# Patient Record
Sex: MALE | Race: WHITE
[De-identification: names, ages, dates, MRNs, and addresses within clinical notes are randomized per-mention and may not be internally consistent; named-entity substitution may affect disease eponyms.]

---

## 2022-11-29 ENCOUNTER — HOSPITAL ENCOUNTER (INPATIENT)
Dept: HOSPITAL 12 - ER | Age: 77
LOS: 2 days | Discharge: HOME | DRG: 871 | End: 2022-12-01
Attending: INTERNAL MEDICINE
Payer: COMMERCIAL

## 2022-11-29 VITALS — BODY MASS INDEX: 27.09 KG/M2 | WEIGHT: 200 LBS | HEIGHT: 72 IN

## 2022-11-29 DIAGNOSIS — I70.90: ICD-10-CM

## 2022-11-29 DIAGNOSIS — U07.1: ICD-10-CM

## 2022-11-29 DIAGNOSIS — I10: ICD-10-CM

## 2022-11-29 DIAGNOSIS — E87.6: ICD-10-CM

## 2022-11-29 DIAGNOSIS — D68.59: ICD-10-CM

## 2022-11-29 DIAGNOSIS — D64.9: ICD-10-CM

## 2022-11-29 DIAGNOSIS — D50.9: ICD-10-CM

## 2022-11-29 DIAGNOSIS — D69.6: ICD-10-CM

## 2022-11-29 DIAGNOSIS — N39.0: ICD-10-CM

## 2022-11-29 DIAGNOSIS — N17.0: ICD-10-CM

## 2022-11-29 DIAGNOSIS — Z85.01: ICD-10-CM

## 2022-11-29 DIAGNOSIS — E86.0: ICD-10-CM

## 2022-11-29 DIAGNOSIS — G92.8: ICD-10-CM

## 2022-11-29 DIAGNOSIS — E87.1: ICD-10-CM

## 2022-11-29 DIAGNOSIS — E66.9: ICD-10-CM

## 2022-11-29 DIAGNOSIS — B96.89: ICD-10-CM

## 2022-11-29 DIAGNOSIS — Z98.890: ICD-10-CM

## 2022-11-29 DIAGNOSIS — E78.5: ICD-10-CM

## 2022-11-29 DIAGNOSIS — Z74.09: ICD-10-CM

## 2022-11-29 DIAGNOSIS — A41.89: Primary | ICD-10-CM

## 2022-11-29 DIAGNOSIS — J21.8: ICD-10-CM

## 2022-11-29 DIAGNOSIS — R53.1: ICD-10-CM

## 2022-11-29 LAB
ALP SERPL-CCNC: 72 U/L (ref 50–136)
ALT SERPL W/O P-5'-P-CCNC: 23 U/L (ref 16–63)
AST SERPL-CCNC: 30 U/L (ref 15–37)
BILIRUB DIRECT SERPL-MCNC: 0.4 MG/DL (ref 0–0.2)
BILIRUB SERPL-MCNC: 2 MG/DL (ref 0.2–1)
BUN SERPL-MCNC: 15 MG/DL (ref 7–18)
CHLORIDE SERPL-SCNC: 102 MMOL/L (ref 98–107)
CO2 SERPL-SCNC: 26 MMOL/L (ref 21–32)
CREAT SERPL-MCNC: 1.2 MG/DL (ref 0.6–1.3)
GLUCOSE SERPL-MCNC: 110 MG/DL (ref 74–106)
HCT VFR BLD AUTO: 37.8 % (ref 36.7–47.1)
LYMPHOCYTES NFR BLD MANUAL: 25 % (ref 20–40)
MCH RBC QN AUTO: 30.6 UUG (ref 23.8–33.4)
MCV RBC AUTO: 92.6 FL (ref 73–96.2)
MONOCYTES NFR BLD MANUAL: 15 % (ref 2–10)
NEUTS BAND NFR BLD MANUAL: 2 % (ref 0–10)
NEUTS SEG NFR BLD MANUAL: 58 % (ref 42–75)
PLATELET # BLD AUTO: 124 K/UL (ref 152–348)
POTASSIUM SERPL-SCNC: 3.6 MMOL/L (ref 3.5–5.1)
WS STN SPEC: 7.8 G/DL (ref 6.4–8.2)

## 2022-11-29 PROCEDURE — G0378 HOSPITAL OBSERVATION PER HR: HCPCS

## 2022-11-29 PROCEDURE — A4663 DIALYSIS BLOOD PRESSURE CUFF: HCPCS

## 2022-11-29 PROCEDURE — C9113 INJ PANTOPRAZOLE SODIUM, VIA: HCPCS

## 2022-11-29 NOTE — NUR
Called [facility] to ask for updates regarding transfer, spoke with Miguel and 
was told that no beds available at this time. Will call me back for updates

## 2022-11-30 LAB
ALP SERPL-CCNC: 60 U/L (ref 50–136)
ALT SERPL W/O P-5'-P-CCNC: 18 U/L (ref 16–63)
APPEARANCE UR: CLEAR
AST SERPL-CCNC: 14 U/L (ref 15–37)
BILIRUB DIRECT SERPL-MCNC: 0.3 MG/DL (ref 0–0.2)
BILIRUB SERPL-MCNC: 1 MG/DL (ref 0.2–1)
BILIRUB UR QL STRIP: NEGATIVE
BUN SERPL-MCNC: 23 MG/DL (ref 7–18)
CHLORIDE SERPL-SCNC: 104 MMOL/L (ref 98–107)
CHOLEST SERPL-MCNC: 112 MG/DL (ref ?–200)
CO2 SERPL-SCNC: 27 MMOL/L (ref 21–32)
COLOR UR: YELLOW
CREAT SERPL-MCNC: 1.3 MG/DL (ref 0.6–1.3)
DEPRECATED SQUAMOUS URNS QL MICRO: (no result) /HPF
FERRITIN SERPL-MCNC: 148 NG/ML (ref 26–388)
GLUCOSE SERPL-MCNC: 97 MG/DL (ref 74–106)
GLUCOSE UR STRIP-MCNC: NEGATIVE MG/DL
HCT VFR BLD AUTO: 34 % (ref 36.7–47.1)
HDLC SERPL-MCNC: 43 MG/DL (ref 40–60)
HGB UR QL STRIP: (no result)
KETONES UR STRIP-MCNC: NEGATIVE MG/DL
LEUKOCYTE ESTERASE UR QL STRIP: (no result)
LYMPHOCYTES NFR BLD MANUAL: 35 % (ref 20–40)
MAGNESIUM SERPL-MCNC: 1.8 MG/DL (ref 1.8–2.4)
MCH RBC QN AUTO: 30.9 UUG (ref 23.8–33.4)
MCV RBC AUTO: 92.7 FL (ref 73–96.2)
MONOCYTES NFR BLD MANUAL: 16 % (ref 2–10)
NEUTS SEG NFR BLD MANUAL: 49 % (ref 42–75)
NITRITE UR QL STRIP: NEGATIVE
PH UR STRIP: 5.5 [PH] (ref 5–8)
PHOSPHATE SERPL-MCNC: 3.5 MG/DL (ref 2.5–4.9)
PLATELET # BLD AUTO: 99 K/UL (ref 152–348)
POTASSIUM SERPL-SCNC: 4 MMOL/L (ref 3.5–5.1)
RBC #/AREA URNS HPF: (no result) /HPF (ref 0–3)
SP GR UR STRIP: 1.02 (ref 1–1.03)
TRIGL SERPL-MCNC: 63 MG/DL (ref 30–150)
TSH SERPL DL<=0.005 MIU/L-ACNC: 0.41 MIU/ML (ref 0.36–3.74)
UROBILINOGEN UR STRIP-MCNC: 0.2 E.U./DL
WBC #/AREA URNS HPF: (no result) /HPF
WBC #/AREA URNS HPF: (no result) /HPF (ref 0–3)
WS STN SPEC: 6.8 G/DL (ref 6.4–8.2)

## 2022-11-30 RX ADMIN — LOSARTAN POTASSIUM SCH MG: 50 TABLET, FILM COATED ORAL at 09:00

## 2022-11-30 RX ADMIN — AMLODIPINE BESYLATE SCH MG: 5 TABLET ORAL at 09:00

## 2022-11-30 RX ADMIN — DEXTROSE SCH MLS/HR: 50 INJECTION, SOLUTION INTRAVENOUS at 06:10

## 2022-11-30 RX ADMIN — LOSARTAN POTASSIUM SCH MG: 50 TABLET, FILM COATED ORAL at 22:04

## 2022-11-30 RX ADMIN — DEXTROSE SCH MG: 50 INJECTION, SOLUTION INTRAVENOUS at 09:00

## 2022-11-30 RX ADMIN — DEXTROSE SCH MLS/HR: 5 SOLUTION INTRAVENOUS at 06:39

## 2022-11-30 RX ADMIN — DEXTROSE SCH MLS/HR: 5 SOLUTION INTRAVENOUS at 07:30

## 2022-11-30 NOTE — NUR
Called Tele again for update regarding a bed for the covid patient, still no 
available bed. Notified charge nurse. Served dinner for the patient.

## 2022-11-30 NOTE — NUR
Called [facility] to ask for updates, spoke with Chase. was told no beds 
available at this moment. Will give us a call once they get a transfer info

## 2022-11-30 NOTE — NUR
Patient has been admitted since 11/29, still no room avaliable. Patient is 
still in ER and ER charting will be done. Received call from Daphney SPENCER, awating 
for avaliable COVID room. Waiting for call back when bed is ready.

## 2022-12-01 VITALS — DIASTOLIC BLOOD PRESSURE: 67 MMHG | SYSTOLIC BLOOD PRESSURE: 136 MMHG

## 2022-12-01 LAB
BUN SERPL-MCNC: 17 MG/DL (ref 7–18)
CHLORIDE SERPL-SCNC: 102 MMOL/L (ref 98–107)
CO2 SERPL-SCNC: 29 MMOL/L (ref 21–32)
CREAT SERPL-MCNC: 1.3 MG/DL (ref 0.6–1.3)
GLUCOSE SERPL-MCNC: 135 MG/DL (ref 74–106)
HCT VFR BLD AUTO: 35.8 % (ref 36.7–47.1)
MAGNESIUM SERPL-MCNC: 1.8 MG/DL (ref 1.8–2.4)
MCH RBC QN AUTO: 30.8 UUG (ref 23.8–33.4)
MCV RBC AUTO: 92.2 FL (ref 73–96.2)
NEUTS SEG NFR BLD MANUAL: 0 % (ref 42–75)
PHOSPHATE SERPL-MCNC: 3.9 MG/DL (ref 2.5–4.9)
PLATELET # BLD AUTO: 103 K/UL (ref 152–348)
POTASSIUM SERPL-SCNC: 3.4 MMOL/L (ref 3.5–5.1)

## 2022-12-01 RX ADMIN — DEXTROSE SCH MG: 50 INJECTION, SOLUTION INTRAVENOUS at 09:00

## 2022-12-01 RX ADMIN — LOSARTAN POTASSIUM SCH MG: 50 TABLET, FILM COATED ORAL at 20:51

## 2022-12-01 RX ADMIN — LOSARTAN POTASSIUM SCH MG: 50 TABLET, FILM COATED ORAL at 09:00

## 2022-12-01 RX ADMIN — AMLODIPINE BESYLATE SCH MG: 5 TABLET ORAL at 09:00

## 2022-12-01 RX ADMIN — DEXTROSE SCH MLS/HR: 50 INJECTION, SOLUTION INTRAVENOUS at 05:50

## 2022-12-01 NOTE — NUR
PT RESTING IN BED; STATES HIS PAIN ON THE LLE IS NOW 2/10 ACHING; STATES 
SATISFACTION W/ PAIN RELIEF